# Patient Record
Sex: MALE | Race: WHITE | NOT HISPANIC OR LATINO | Employment: FULL TIME | URBAN - METROPOLITAN AREA
[De-identification: names, ages, dates, MRNs, and addresses within clinical notes are randomized per-mention and may not be internally consistent; named-entity substitution may affect disease eponyms.]

---

## 2023-09-21 LAB
ALANINE AMINOTRANSFERASE (SGPT) (U/L) IN SER/PLAS: 12 U/L (ref 10–52)
ALBUMIN (G/DL) IN SER/PLAS: 4.9 G/DL (ref 3.4–5)
ALKALINE PHOSPHATASE (U/L) IN SER/PLAS: 72 U/L (ref 33–120)
ANION GAP IN SER/PLAS: 16 MMOL/L (ref 10–20)
ASPARTATE AMINOTRANSFERASE (SGOT) (U/L) IN SER/PLAS: 16 U/L (ref 9–39)
BASOPHILS (10*3/UL) IN BLOOD BY AUTOMATED COUNT: 0.07 X10E9/L (ref 0–0.1)
BASOPHILS/100 LEUKOCYTES IN BLOOD BY AUTOMATED COUNT: 1.1 % (ref 0–2)
BILIRUBIN TOTAL (MG/DL) IN SER/PLAS: 0.6 MG/DL (ref 0–1.2)
CALCIDIOL (25 OH VITAMIN D3) (NG/ML) IN SER/PLAS: 38 NG/ML
CALCIUM (MG/DL) IN SER/PLAS: 9.9 MG/DL (ref 8.6–10.6)
CARBON DIOXIDE, TOTAL (MMOL/L) IN SER/PLAS: 25 MMOL/L (ref 21–32)
CHLORIDE (MMOL/L) IN SER/PLAS: 104 MMOL/L (ref 98–107)
CREATININE (MG/DL) IN SER/PLAS: 1 MG/DL (ref 0.5–1.3)
EOSINOPHILS (10*3/UL) IN BLOOD BY AUTOMATED COUNT: 0.29 X10E9/L (ref 0–0.7)
EOSINOPHILS/100 LEUKOCYTES IN BLOOD BY AUTOMATED COUNT: 4.5 % (ref 0–6)
ERYTHROCYTE DISTRIBUTION WIDTH (RATIO) BY AUTOMATED COUNT: 12.4 % (ref 11.5–14.5)
ERYTHROCYTE MEAN CORPUSCULAR HEMOGLOBIN CONCENTRATION (G/DL) BY AUTOMATED: 33.3 G/DL (ref 32–36)
ERYTHROCYTE MEAN CORPUSCULAR VOLUME (FL) BY AUTOMATED COUNT: 92 FL (ref 80–100)
ERYTHROCYTES (10*6/UL) IN BLOOD BY AUTOMATED COUNT: 4.64 X10E12/L (ref 4.5–5.9)
GFR MALE: >90 ML/MIN/1.73M2
GLUCOSE (MG/DL) IN SER/PLAS: 78 MG/DL (ref 74–99)
HEMATOCRIT (%) IN BLOOD BY AUTOMATED COUNT: 42.9 % (ref 41–52)
HEMOGLOBIN (G/DL) IN BLOOD: 14.3 G/DL (ref 13.5–17.5)
HIV 1/ 2 AG/AB SCREEN: NONREACTIVE
IMMATURE GRANULOCYTES/100 LEUKOCYTES IN BLOOD BY AUTOMATED COUNT: 0.3 % (ref 0–0.9)
LEUKOCYTES (10*3/UL) IN BLOOD BY AUTOMATED COUNT: 6.4 X10E9/L (ref 4.4–11.3)
LYMPHOCYTES (10*3/UL) IN BLOOD BY AUTOMATED COUNT: 1.08 X10E9/L (ref 1.2–4.8)
LYMPHOCYTES/100 LEUKOCYTES IN BLOOD BY AUTOMATED COUNT: 16.9 % (ref 13–44)
MONOCYTES (10*3/UL) IN BLOOD BY AUTOMATED COUNT: 0.66 X10E9/L (ref 0.1–1)
MONOCYTES/100 LEUKOCYTES IN BLOOD BY AUTOMATED COUNT: 10.3 % (ref 2–10)
NEUTROPHILS (10*3/UL) IN BLOOD BY AUTOMATED COUNT: 4.27 X10E9/L (ref 1.2–7.7)
NEUTROPHILS/100 LEUKOCYTES IN BLOOD BY AUTOMATED COUNT: 66.9 % (ref 40–80)
NRBC (PER 100 WBCS) BY AUTOMATED COUNT: 0 /100 WBC (ref 0–0)
PLATELETS (10*3/UL) IN BLOOD AUTOMATED COUNT: 448 X10E9/L (ref 150–450)
POTASSIUM (MMOL/L) IN SER/PLAS: 4.4 MMOL/L (ref 3.5–5.3)
PROTEIN TOTAL: 7.5 G/DL (ref 6.4–8.2)
SEDIMENTATION RATE, ERYTHROCYTE: 3 MM/H (ref 0–15)
SODIUM (MMOL/L) IN SER/PLAS: 141 MMOL/L (ref 136–145)
THYROTROPIN (MIU/L) IN SER/PLAS BY DETECTION LIMIT <= 0.05 MIU/L: 0.28 MIU/L (ref 0.44–3.98)
THYROXINE (T4) FREE (NG/DL) IN SER/PLAS: 1.24 NG/DL (ref 0.78–1.48)
TRIIODOTHYRONINE (T3) FREE (PG/ML) IN SER/PLAS: 3.8 PG/ML (ref 2.3–4.2)
UREA NITROGEN (MG/DL) IN SER/PLAS: 12 MG/DL (ref 6–23)

## 2023-10-02 ENCOUNTER — APPOINTMENT (OUTPATIENT)
Dept: RADIOLOGY | Facility: HOSPITAL | Age: 28
End: 2023-10-02
Payer: COMMERCIAL

## 2023-10-25 ENCOUNTER — TELEPHONE (OUTPATIENT)
Dept: PRIMARY CARE | Facility: CLINIC | Age: 28
End: 2023-10-25
Payer: COMMERCIAL

## 2023-11-01 ENCOUNTER — APPOINTMENT (OUTPATIENT)
Dept: RADIOLOGY | Facility: HOSPITAL | Age: 28
End: 2023-11-01
Payer: COMMERCIAL

## 2023-11-07 ENCOUNTER — TELEPHONE (OUTPATIENT)
Dept: PRIMARY CARE | Facility: CLINIC | Age: 28
End: 2023-11-07
Payer: COMMERCIAL

## 2023-11-14 ENCOUNTER — TELEPHONE (OUTPATIENT)
Dept: PRIMARY CARE | Facility: CLINIC | Age: 28
End: 2023-11-14
Payer: COMMERCIAL

## 2023-11-20 PROBLEM — S99.919A INJURY OF ANKLE: Status: ACTIVE | Noted: 2023-11-20

## 2023-11-20 PROBLEM — S93.409A SPRAIN OF ANKLE: Status: ACTIVE | Noted: 2023-11-20

## 2023-11-20 PROBLEM — L70.9 ACNE: Status: ACTIVE | Noted: 2023-11-20

## 2023-11-20 PROBLEM — R63.1 EXCESSIVE THIRST: Status: ACTIVE | Noted: 2023-11-20

## 2023-11-20 PROBLEM — E78.00 HYPERCHOLESTEROLEMIA: Status: ACTIVE | Noted: 2023-11-20

## 2023-11-20 PROBLEM — Q67.6 PECTUS EXCAVATUM: Status: ACTIVE | Noted: 2023-11-20

## 2025-03-13 ENCOUNTER — OFFICE VISIT (OUTPATIENT)
Dept: URGENT CARE | Age: 30
End: 2025-03-13
Payer: COMMERCIAL

## 2025-03-13 VITALS
OXYGEN SATURATION: 99 % | WEIGHT: 241 LBS | DIASTOLIC BLOOD PRESSURE: 69 MMHG | BODY MASS INDEX: 30.93 KG/M2 | HEART RATE: 80 BPM | RESPIRATION RATE: 18 BRPM | TEMPERATURE: 98.8 F | HEIGHT: 74 IN | SYSTOLIC BLOOD PRESSURE: 109 MMHG

## 2025-03-13 DIAGNOSIS — K59.00 CONSTIPATION, UNSPECIFIED CONSTIPATION TYPE: Primary | ICD-10-CM

## 2025-03-13 DIAGNOSIS — R10.32 LEFT LOWER QUADRANT ABDOMINAL PAIN: ICD-10-CM

## 2025-03-13 ASSESSMENT — ENCOUNTER SYMPTOMS
CHEST TIGHTNESS: 0
APPETITE CHANGE: 0
WOUND: 0
PALPITATIONS: 0
DIFFICULTY URINATING: 0
NAUSEA: 0
FREQUENCY: 0
HEMATOCHEZIA: 0
FACIAL SWELLING: 0
ABDOMINAL PAIN: 1
CHILLS: 0
WEIGHT LOSS: 1
BELCHING: 0
DIZZINESS: 0
HEADACHES: 0
EYE PAIN: 0
RHINORRHEA: 0
ANOREXIA: 0
FEVER: 0
MYALGIAS: 0
DYSURIA: 0
FATIGUE: 0
DIARRHEA: 0
HEMATURIA: 0
SINUS PRESSURE: 0
CONSTIPATION: 1
COUGH: 0
SINUS PAIN: 0
SORE THROAT: 0
ARTHRALGIAS: 0
FLATUS: 0
SHORTNESS OF BREATH: 0
JOINT SWELLING: 0
VOMITING: 0
BACK PAIN: 0

## 2025-03-13 ASSESSMENT — VISUAL ACUITY: OU: 1

## 2025-03-14 NOTE — PROGRESS NOTES
Subjective   Patient ID: Gerardo Echavarria is a 29 y.o. male. They present today with a chief complaint of Abdominal Pain (Left abdominal pain x 3 1/2 weeks.).    History of Present Illness  Patient is a 30yo male who presents with left abdominal pain x 3 1/2 weeks. Patient states that he has been having issues with constipation. His last bowel movement was two days ago and patient states it was normal. He also had a bowel movement the day before that. He states that he has pain in the LLQ after he eats so he has not been eating much the past month.           Abdominal Pain  This is a new problem. The current episode started 1 to 4 weeks ago. The onset quality is gradual. The problem occurs intermittently. The problem has been waxing and waning. The pain is located in the LLQ. The pain is mild. Associated symptoms include constipation and weight loss. Pertinent negatives include no anorexia, arthralgias, belching, diarrhea, dysuria, fever, flatus, frequency, headaches, hematochezia, hematuria, melena, myalgias, nausea or vomiting. The pain is aggravated by eating. He has tried nothing for the symptoms.       Past Medical History  Allergies as of 03/13/2025    (No Known Allergies)       (Not in a hospital admission)       Past Medical History:   Diagnosis Date    Other specified health status 09/03/2014    No pertinent past medical history       No past surgical history on file.     reports that he has never smoked. He has never been exposed to tobacco smoke. He has never used smokeless tobacco. He reports that he does not use drugs.    Review of Systems  Review of Systems   Constitutional:  Positive for weight loss. Negative for appetite change, chills, fatigue and fever.   HENT:  Negative for congestion, dental problem, drooling, ear discharge, ear pain, facial swelling, hearing loss, mouth sores, postnasal drip, rhinorrhea, sinus pressure, sinus pain, sneezing and sore throat.    Eyes:  Negative for pain.  "  Respiratory:  Negative for cough, chest tightness and shortness of breath.    Cardiovascular:  Negative for chest pain and palpitations.   Gastrointestinal:  Positive for abdominal pain and constipation. Negative for anorexia, diarrhea, flatus, hematochezia, melena, nausea and vomiting.   Genitourinary:  Negative for difficulty urinating, dysuria, frequency and hematuria.   Musculoskeletal:  Negative for arthralgias, back pain, gait problem, joint swelling and myalgias.   Skin:  Negative for rash and wound.   Neurological:  Negative for dizziness and headaches.   Psychiatric/Behavioral:  Negative for self-injury and suicidal ideas.                                   Objective    Vitals:    03/13/25 1910   BP: 109/69   BP Location: Left arm   Patient Position: Sitting   BP Cuff Size: Small adult   Pulse: 80   Resp: 18   Temp: 37.1 °C (98.8 °F)   TempSrc: Temporal   SpO2: 99%   Weight: 109 kg (241 lb)   Height: 1.88 m (6' 2\")     No LMP for male patient.    Physical Exam  Constitutional:       General: He is awake.      Appearance: Normal appearance. He is well-developed, well-groomed and normal weight. He is not ill-appearing.   HENT:      Head: Normocephalic and atraumatic.   Eyes:      General: Lids are normal. Vision grossly intact.   Cardiovascular:      Rate and Rhythm: Normal rate and regular rhythm.      Heart sounds: Normal heart sounds, S1 normal and S2 normal. Heart sounds not distant. No murmur heard.     No friction rub. No gallop.   Pulmonary:      Effort: Pulmonary effort is normal. No tachypnea, bradypnea, accessory muscle usage, prolonged expiration, respiratory distress or retractions.      Breath sounds: Normal breath sounds and air entry. No stridor, decreased air movement or transmitted upper airway sounds. No decreased breath sounds, wheezing, rhonchi or rales.   Chest:      Chest wall: No deformity.   Abdominal:      General: Abdomen is flat. Bowel sounds are normal. There is no distension. " There are no signs of injury.      Palpations: Abdomen is soft. There is no shifting dullness, fluid wave, hepatomegaly, splenomegaly, mass or pulsatile mass.      Tenderness: There is no abdominal tenderness. There is no right CVA tenderness, left CVA tenderness, guarding or rebound. Negative signs include Christopher's sign, Rovsing's sign and McBurney's sign.      Hernia: No hernia is present.   Skin:     General: Skin is warm and dry.      Capillary Refill: Capillary refill takes less than 2 seconds.   Neurological:      General: No focal deficit present.      Mental Status: He is alert.   Psychiatric:         Attention and Perception: Attention and perception normal.         Mood and Affect: Mood and affect normal.         Speech: Speech normal.         Behavior: Behavior normal. Behavior is cooperative.         Procedures    Point of Care Test & Imaging Results from this visit  No results found for this visit on 03/13/25.   No results found.    Diagnostic study results (if any) were reviewed by JOSS Falk.    Assessment/Plan   Allergies, medications, history, and pertinent labs/EKGs/Imaging reviewed by JOSS Falk.     Medical Decision Making  Physical exam was unremarkable. Referral to gastroenterology was placed for further evaluation.  Advised patient that he would require a ct scan or ultrasound. Patient was stable and not in any current acute distress.     Risks, benefits, and alternatives of the medications and treatment plan prescribed today were discussed, and patient expressed understanding. Plan follow up as discussed or as needed if any worsening symptoms or change in condition. Reinforced red flags including (but not limited to): severe or worsening abdominal pain; difficulty swallowing; stiff neck; shortness of breath; coughing or vomiting blood; chest pain; and new or increased fever are indications to go to the Emergency Department.    The patient voices  understanding of all medications. No barriers to adherence. Patient is taking all medications as prescribed and tolerating well. For any new medications, the patient was instructed of directions for and consequences of not taking medication and they were informed about the potential side effects and drug interactions. The after-visit summary was given to the patient and care instructions were reviewed with the patient. All questions were answered and the patient verbalized understanding of the plan of care for today.  -  Orders and Diagnoses  Diagnoses and all orders for this visit:  Constipation, unspecified constipation type  -     Referral to Gastroenterology; Future  Left lower quadrant abdominal pain  -     Referral to Gastroenterology; Future      Medical Admin Record      Patient disposition: Home    Electronically signed by JOSS Falk  10:12 PM

## 2025-03-18 ENCOUNTER — APPOINTMENT (OUTPATIENT)
Dept: PRIMARY CARE | Facility: CLINIC | Age: 30
End: 2025-03-18
Payer: COMMERCIAL

## 2025-03-18 VITALS
SYSTOLIC BLOOD PRESSURE: 109 MMHG | WEIGHT: 146.16 LBS | OXYGEN SATURATION: 97 % | DIASTOLIC BLOOD PRESSURE: 69 MMHG | RESPIRATION RATE: 17 BRPM | BODY MASS INDEX: 18.77 KG/M2 | HEART RATE: 90 BPM

## 2025-03-18 DIAGNOSIS — R10.32 LLQ PAIN: ICD-10-CM

## 2025-03-18 DIAGNOSIS — R14.0 BLOATING: ICD-10-CM

## 2025-03-18 DIAGNOSIS — K59.00 CONSTIPATION, UNSPECIFIED CONSTIPATION TYPE: ICD-10-CM

## 2025-03-18 DIAGNOSIS — R10.9 ABDOMINAL CRAMPING: ICD-10-CM

## 2025-03-18 DIAGNOSIS — Z00.00 ROUTINE GENERAL MEDICAL EXAMINATION AT A HEALTH CARE FACILITY: Primary | ICD-10-CM

## 2025-03-18 DIAGNOSIS — R19.7 DIARRHEA, UNSPECIFIED TYPE: ICD-10-CM

## 2025-03-18 PROCEDURE — 99395 PREV VISIT EST AGE 18-39: CPT | Performed by: PHYSICIAN ASSISTANT

## 2025-03-18 PROCEDURE — 1036F TOBACCO NON-USER: CPT | Performed by: PHYSICIAN ASSISTANT

## 2025-03-18 ASSESSMENT — ENCOUNTER SYMPTOMS
BACK PAIN: 0
DIARRHEA: 0
ANOREXIA: 1
ARTHRALGIAS: 0
PSYCHIATRIC NEGATIVE: 1
LOSS OF SENSATION IN FEET: 0
DIZZINESS: 0
OCCASIONAL FEELINGS OF UNSTEADINESS: 0
NAUSEA: 0
VOMITING: 0
DYSURIA: 0
ARTHRALGIAS: 1
RESPIRATORY NEGATIVE: 1
EYES NEGATIVE: 1
HEMATOLOGIC/LYMPHATIC NEGATIVE: 1
NEUROLOGICAL NEGATIVE: 1
HEADACHES: 0
FREQUENCY: 0
WHEEZING: 0
SHORTNESS OF BREATH: 0
DEPRESSION: 0
HEMATOCHEZIA: 1
ABDOMINAL PAIN: 1
ENDOCRINE NEGATIVE: 1
CONSTIPATION: 1
ALLERGIC/IMMUNOLOGIC NEGATIVE: 1
HEMATURIA: 0
PALPITATIONS: 0
COUGH: 0
FLATUS: 0
WEIGHT LOSS: 1
MYALGIAS: 0
NERVOUS/ANXIOUS: 0
FEVER: 0

## 2025-03-18 ASSESSMENT — PATIENT HEALTH QUESTIONNAIRE - PHQ9
1. LITTLE INTEREST OR PLEASURE IN DOING THINGS: NOT AT ALL
2. FEELING DOWN, DEPRESSED OR HOPELESS: NOT AT ALL
SUM OF ALL RESPONSES TO PHQ9 QUESTIONS 1 AND 2: 0

## 2025-03-18 ASSESSMENT — PAIN SCALES - GENERAL: PAINLEVEL_OUTOF10: 3

## 2025-03-18 NOTE — PROGRESS NOTES
Answers submitted by the patient for this visit:  Abdominal Pain Questionnaire (Submitted on 3/18/2025)  Chief Complaint: Abdominal pain  Chronicity: recurrent  Onset: 1 to 4 weeks ago  Onset quality: gradual  Frequency: daily  Episode duration: 4 Hours  Progression since onset: waxing and waning  Pain location: LLQ  Pain - numeric: 5/10  Pain quality: aching, cramping, a sensation of fullness  Radiates to: LUQ  anorexia: Yes  arthralgias: Yes  constipation: Yes  diarrhea: No  dysuria: No  fever: No  flatus: No  frequency: No  headaches: No  hematochezia: Yes  hematuria: No  melena: No  myalgias: No  nausea: No  weight loss: Yes  vomiting: No  Aggravated by: certain positions, eating  Relieved by: being still, certain positions, recumbency, sitting up  Diagnostic workup: CT scan  Subjective   Patient ID: Gerardo Echavarria is a 29 y.o. male who presents for Abdominal Pain (LLQ) and Constipation.    Abdominal Pain  This is a recurrent problem. The current episode started 1 to 4 weeks ago. The onset quality is gradual. The problem occurs daily. The most recent episode lasted 4 hours. The problem has been waxing and waning. The pain is located in the LLQ. The pain is at a severity of 5/10. The quality of the pain is aching, cramping and a sensation of fullness. The abdominal pain radiates to the LUQ. Associated symptoms include anorexia, constipation, hematochezia and weight loss. Pertinent negatives include no arthralgias, diarrhea, dysuria, fever, flatus, frequency, headaches, hematuria, melena, myalgias, nausea or vomiting. The pain is aggravated by certain positions and eating. The pain is relieved by Being still, certain positions, recumbency and sitting up. Prior diagnostic workup includes CT scan.   Constipation  Associated symptoms include abdominal pain, anorexia, hematochezia and weight loss. Pertinent negatives include no back pain, diarrhea, fever, flatus, melena, nausea or vomiting.        Patient Gerardo Echavarria  29 y.o. male is here today for annual exam  and follow up     single, works as an , lives with parents, and siblings   specialists none   due dental and vision no glasses or contacts      PMhx significant medical hx acne - abd issues over the past 2 years   PShx: none   Social hx: etoh social on weekend , no tobacco use, no illicit drug use   follows healthy diet and exercise- -basketball   immunizations flu vaccines, tdap is due   FMhx: mother 58yo alive and well , father 63yo alive and well siblings 5 all alive and well   Past medical, surgical, social, and family history all reviewed      patient states feeling well, no complaints at this time   denies N/V, headache, dizziness, CP, SOB, palpitations, edema, numbness, tingling, weakness  A chaperone was offered to the patient for the physical exam and  exam and was declined        C/o abdominal bloating cramping episode again 3 weeks with  - diarrhea  for 1-2daysa dn blood as well -- now resoloved  then constipation since for the past 3 weeks - one BM per 2 days -- eating less as well due to symptoms--pt stttes  when eating a full meal get s pain in left side- so only able to eat smaller amt's and snaking through out the day more or less   Wt steady from 2023 -- but did have episodes of abd issues  and wt loss in 2023  was 161-- and lose about 15-20 #s   Normal urine   Had CT abd in 2023   No  recent travel or illness        Review of Systems   Constitutional:  Positive for weight loss. Negative for fever.   HENT: Negative.     Eyes: Negative.    Respiratory: Negative.  Negative for cough, shortness of breath and wheezing.    Cardiovascular:  Negative for chest pain and palpitations.   Gastrointestinal:  Positive for abdominal pain, anorexia, constipation and hematochezia. Negative for diarrhea, flatus, melena, nausea and vomiting.   Endocrine: Negative.    Genitourinary: Negative.  Negative for dysuria, frequency and hematuria.   Musculoskeletal:   Negative for arthralgias, back pain and myalgias.   Skin: Negative.  Negative for rash.   Allergic/Immunologic: Negative.    Neurological: Negative.  Negative for dizziness and headaches.   Hematological: Negative.    Psychiatric/Behavioral: Negative.  The patient is not nervous/anxious.        Objective   /69 (BP Location: Left arm, Patient Position: Sitting)   Pulse 90   Resp 17   Wt 66.3 kg (146 lb 2.6 oz)   SpO2 97%   BMI 18.77 kg/m²     Physical Exam  Vitals and nursing note reviewed.   Constitutional:       Appearance: Normal appearance. He is normal weight.   HENT:      Head: Normocephalic and atraumatic.      Nose: Nose normal.      Mouth/Throat:      Mouth: Mucous membranes are moist.      Pharynx: Oropharynx is clear.   Eyes:      Pupils: Pupils are equal, round, and reactive to light.   Cardiovascular:      Rate and Rhythm: Normal rate and regular rhythm.      Heart sounds: Normal heart sounds.   Pulmonary:      Effort: Pulmonary effort is normal.      Breath sounds: Normal breath sounds.   Abdominal:      General: Bowel sounds are normal.      Palpations: Abdomen is soft.   Genitourinary:     Comments: Defer      Musculoskeletal:         General: Normal range of motion.      Cervical back: Neck supple.   Skin:     General: Skin is warm.   Neurological:      General: No focal deficit present.      Mental Status: He is alert and oriented to person, place, and time.   Psychiatric:         Mood and Affect: Mood normal.         Behavior: Behavior normal.         Thought Content: Thought content normal.         Judgment: Judgment normal.       Assessment/Plan   Problem List Items Addressed This Visit    None  Visit Diagnoses       Routine general medical examination at a health care facility    -  Primary    Relevant Orders    Lipid Panel    Hepatitis C Antibody    Abdominal cramping        Relevant Orders    CBC    Comprehensive Metabolic Panel    TSH with reflex to Free T4 if abnormal    Referral  to Gastroenterology    LLQ pain        Relevant Orders    CBC    Comprehensive Metabolic Panel    TSH with reflex to Free T4 if abnormal    Referral to Gastroenterology    Diarrhea, unspecified type        Relevant Orders    CBC    Comprehensive Metabolic Panel    TSH with reflex to Free T4 if abnormal    Referral to Gastroenterology    Constipation, unspecified constipation type        Relevant Orders    CBC    Comprehensive Metabolic Panel    TSH with reflex to Free T4 if abnormal    Referral to Gastroenterology    Bloating        Relevant Orders    CBC    Comprehensive Metabolic Panel    TSH with reflex to Free T4 if abnormal    Referral to Gastroenterology          Well exam   -  patient stable and healthy  -  discussed healthy diet and exercise plan   -  continue medications as directed, SEs, risks and options discussed   -  f/u with specialists as directed   -  dental and vision exams, f/u as directed   -  Labs today   -Follow-up annual exam in one year  -Follow-up in one week to discuss all results    Abd pain cramping bloating bloody stools   Labs  ordered today, pt advised to call office when results are available to discuss with provider    May need colonoscopy and CT scan   Refer to GI   R/o colitis crohns IBS - etc        Results of my examination, clinical findings, impression and diagnoses discussed with the patient as well as results of the latest test/lab work. The patient was in agreement with the plan and verbalized a good understanding of instructions and plans for treatment. At the end of the visit today, the patient felt that all questions had been answered satisfactorily. The patient was pleased with the visit and very appreciative for the care rendered.

## 2025-03-19 LAB
ALBUMIN SERPL-MCNC: 5.1 G/DL (ref 3.6–5.1)
ALP SERPL-CCNC: 65 U/L (ref 36–130)
ALT SERPL-CCNC: 9 U/L (ref 9–46)
ANION GAP SERPL CALCULATED.4IONS-SCNC: 8 MMOL/L (CALC) (ref 7–17)
AST SERPL-CCNC: 13 U/L (ref 10–40)
BILIRUB SERPL-MCNC: 0.7 MG/DL (ref 0.2–1.2)
BUN SERPL-MCNC: 11 MG/DL (ref 7–25)
CALCIUM SERPL-MCNC: 10 MG/DL (ref 8.6–10.3)
CHLORIDE SERPL-SCNC: 102 MMOL/L (ref 98–110)
CHOLEST SERPL-MCNC: 174 MG/DL
CHOLEST/HDLC SERPL: 2.9 (CALC)
CO2 SERPL-SCNC: 28 MMOL/L (ref 20–32)
CREAT SERPL-MCNC: 0.98 MG/DL (ref 0.6–1.24)
EGFRCR SERPLBLD CKD-EPI 2021: 108 ML/MIN/1.73M2
ERYTHROCYTE [DISTWIDTH] IN BLOOD BY AUTOMATED COUNT: 12 % (ref 11–15)
GLUCOSE SERPL-MCNC: 90 MG/DL (ref 65–99)
HCT VFR BLD AUTO: 43 % (ref 38.5–50)
HCV AB SERPL QL IA: NORMAL
HDLC SERPL-MCNC: 61 MG/DL
HGB BLD-MCNC: 14.7 G/DL (ref 13.2–17.1)
LDLC SERPL CALC-MCNC: 97 MG/DL (CALC)
MCH RBC QN AUTO: 30.8 PG (ref 27–33)
MCHC RBC AUTO-ENTMCNC: 34.2 G/DL (ref 32–36)
MCV RBC AUTO: 90 FL (ref 80–100)
NONHDLC SERPL-MCNC: 113 MG/DL (CALC)
PLATELET # BLD AUTO: 340 THOUSAND/UL (ref 140–400)
PMV BLD REES-ECKER: 10 FL (ref 7.5–12.5)
POTASSIUM SERPL-SCNC: 4.5 MMOL/L (ref 3.5–5.3)
PROT SERPL-MCNC: 7.7 G/DL (ref 6.1–8.1)
RBC # BLD AUTO: 4.78 MILLION/UL (ref 4.2–5.8)
SODIUM SERPL-SCNC: 138 MMOL/L (ref 135–146)
TRIGL SERPL-MCNC: 70 MG/DL
TSH SERPL-ACNC: 0.58 MIU/L (ref 0.4–4.5)
WBC # BLD AUTO: 3.8 THOUSAND/UL (ref 3.8–10.8)

## 2025-03-20 NOTE — RESULT ENCOUNTER NOTE
Please call and inform pt that all labs are stable - please see GI  next week   And call office with any further questions or concerns

## 2025-03-24 ENCOUNTER — APPOINTMENT (OUTPATIENT)
Dept: GASTROENTEROLOGY | Facility: CLINIC | Age: 30
End: 2025-03-24
Payer: COMMERCIAL

## 2025-03-24 VITALS — HEART RATE: 86 BPM | BODY MASS INDEX: 19.25 KG/M2 | HEIGHT: 74 IN | WEIGHT: 150 LBS | OXYGEN SATURATION: 100 %

## 2025-03-24 DIAGNOSIS — R10.9 ABDOMINAL CRAMPING: ICD-10-CM

## 2025-03-24 DIAGNOSIS — R19.7 DIARRHEA, UNSPECIFIED TYPE: ICD-10-CM

## 2025-03-24 DIAGNOSIS — K62.5 RECTAL BLEEDING: Primary | ICD-10-CM

## 2025-03-24 DIAGNOSIS — R14.0 BLOATING: ICD-10-CM

## 2025-03-24 DIAGNOSIS — R10.32 LLQ PAIN: ICD-10-CM

## 2025-03-24 DIAGNOSIS — K59.00 CONSTIPATION, UNSPECIFIED CONSTIPATION TYPE: ICD-10-CM

## 2025-03-24 PROCEDURE — 99204 OFFICE O/P NEW MOD 45 MIN: CPT

## 2025-03-24 PROCEDURE — 3008F BODY MASS INDEX DOCD: CPT

## 2025-03-24 RX ORDER — SODIUM, POTASSIUM,MAG SULFATES 17.5-3.13G
1 SOLUTION, RECONSTITUTED, ORAL ORAL ONCE
Qty: 1 EACH | Refills: 0 | Status: SHIPPED | OUTPATIENT
Start: 2025-03-24 | End: 2025-03-24

## 2025-03-24 ASSESSMENT — ENCOUNTER SYMPTOMS
CONSTIPATION: 1
DIARRHEA: 1
VOMITING: 0
BLOOD IN STOOL: 0
NAUSEA: 0
FEVER: 0
TROUBLE SWALLOWING: 0
COUGH: 0
APPETITE CHANGE: 0
SHORTNESS OF BREATH: 0
ANAL BLEEDING: 1
CHILLS: 0
ABDOMINAL PAIN: 1
ABDOMINAL DISTENTION: 0
RECTAL PAIN: 0
FATIGUE: 0

## 2025-03-24 NOTE — PROGRESS NOTES
Subjective     History of Present Illness:   Gerardo Echavarria is a 29 y.o. male with PMHx of HLD who presents to GI clinic for further evaluation LLQ pain, hematochezia and constipation    Today, patient has had sharp severe squeezing low abdominal pain followed by diarrhea x 1 week then constipation then dull LLQ pain.  Symptoms started about a month ago.  Diarrhea was loose 4-5 times daily with bright red blood on toilet paper. Went 4 days without bowel movement then hard stool.  Stool is dark brown.  LLQ pain is triggered by eating. Has been eating less because of it.  Recently, constipation appears to be resolving.  Stopped drinking alcohol when this started.   Has had 2 episodes like this 2019 and 2023.  He is not able to identify any correlating factors.  3/2025 normal H&H  Denies dyspepsia, melena, dysphagia, unintentional weight loss    ETOH 10-15 drinks per week, denies smoking, denies marijuana  Denies fxh GI cancer paternal uncle pancreatic cancer.  Denies fhx IBD  Abdominal Surgeries: denies    Denies history of colonoscopy   Denies history of EGD       Past Medical History  As per HPI.     Social History  he  reports that he has never smoked. He has never been exposed to tobacco smoke. He has never used smokeless tobacco. He reports that he does not use drugs.     Family History  his family history is not on file.     Review of Systems  Review of Systems   Constitutional:  Negative for appetite change, chills, fatigue and fever.   HENT:  Negative for trouble swallowing.    Respiratory:  Negative for cough and shortness of breath.    Gastrointestinal:  Positive for abdominal pain, anal bleeding, constipation and diarrhea. Negative for abdominal distention, blood in stool, nausea, rectal pain and vomiting.       Allergies  No Known Allergies    Medications  Current Outpatient Medications   Medication Instructions    sodium,potassium,mag sulfates (Suprep Bowel Prep Kit) 17.5-3.13-1.6 gram solution 2 bottles,  oral, Once, Take as directed.        Objective   Visit Vitals  Pulse 86      Physical Exam  Constitutional:       Appearance: Normal appearance. He is normal weight.   HENT:      Mouth/Throat:      Mouth: Mucous membranes are dry.      Pharynx: Oropharynx is clear.   Cardiovascular:      Rate and Rhythm: Normal rate and regular rhythm.   Pulmonary:      Effort: Pulmonary effort is normal.      Breath sounds: Normal breath sounds. No wheezing or rhonchi.   Abdominal:      General: Abdomen is flat. Bowel sounds are normal. There is no distension.      Palpations: Abdomen is soft. There is no hepatomegaly.      Tenderness: There is abdominal tenderness (LLQ). There is no guarding or rebound. Negative signs include Christopher's sign.      Hernia: No hernia is present.   Musculoskeletal:         General: Normal range of motion.   Skin:     General: Skin is warm and dry.   Neurological:      General: No focal deficit present.      Mental Status: He is alert and oriented to person, place, and time.   Psychiatric:         Mood and Affect: Mood normal.         Behavior: Behavior normal.           Lab Results   Component Value Date    WBC 3.8 03/18/2025    WBC 6.4 09/21/2023    WBC 5.9 01/12/2023    HGB 14.7 03/18/2025    HGB 14.3 09/21/2023    HGB 13.8 01/12/2023    HCT 43.0 03/18/2025    HCT 42.9 09/21/2023    HCT 40.2 (L) 01/12/2023     03/18/2025     09/21/2023     01/12/2023     Lab Results   Component Value Date     03/18/2025     09/21/2023     01/12/2023    K 4.5 03/18/2025    K 4.4 09/21/2023    K 4.4 01/12/2023     03/18/2025     09/21/2023     01/12/2023    CO2 28 03/18/2025    CO2 25 09/21/2023    CO2 28 01/12/2023    BUN 11 03/18/2025    BUN 12 09/21/2023    BUN 17 01/12/2023    CREATININE 0.98 03/18/2025    CREATININE 1.00 09/21/2023    CREATININE 1.12 01/12/2023    CALCIUM 10.0 03/18/2025    CALCIUM 9.9 09/21/2023    CALCIUM 10.1 01/12/2023    PROT 7.7  03/18/2025    PROT 7.5 09/21/2023    PROT 7.9 01/12/2023    BILITOT 0.7 03/18/2025    BILITOT 0.6 09/21/2023    BILITOT 0.6 01/12/2023    ALKPHOS 65 03/18/2025    ALKPHOS 72 09/21/2023    ALKPHOS 89 01/12/2023    ALT 9 03/18/2025    ALT 12 09/21/2023    ALT 17 01/12/2023    AST 13 03/18/2025    AST 16 09/21/2023    AST 21 01/12/2023    GLUCOSE 90 03/18/2025    GLUCOSE 78 09/21/2023    GLUCOSE 94 01/12/2023           Gerardo Echavarria is a 29 y.o. male who presents to GI clinic for rectal bleeding, abdominal pain, constipation versus diarrhea.    LLQ pain  Consider IBD, possibly diverticulitis, IBS with hemorrhoidal bleeding  -Colonoscopy ordered    Follow-up 3 weeks postprocedure.  Consider CT abdomen pelvis         Abby Boles, APRN-CNP

## 2025-03-24 NOTE — ASSESSMENT & PLAN NOTE
Consider IBD, possibly diverticulitis, IBS with hemorrhoidal bleeding  -Colonoscopy ordered    Follow-up 3 weeks postprocedure.  Consider CT abdomen pelvis

## 2025-03-24 NOTE — PATIENT INSTRUCTIONS
Please schedule your colonoscopy. You will need a ride since this involves sedation.  I will send a bowel prep to your pharmacy.  Clear liquid diet the day before the procedure. Start the 1st part of the prep at 6 pm the night prior and the other half 5 hrs before the procedure.  Please notify me 1 week before if you are constipated.  Please follow up 3 weeks post procedure

## 2025-04-22 ENCOUNTER — APPOINTMENT (OUTPATIENT)
Dept: GASTROENTEROLOGY | Facility: EXTERNAL LOCATION | Age: 30
End: 2025-04-22
Payer: COMMERCIAL

## 2025-04-22 DIAGNOSIS — R10.32 LLQ PAIN: ICD-10-CM

## 2025-04-22 DIAGNOSIS — R19.7 DIARRHEA, UNSPECIFIED TYPE: ICD-10-CM

## 2025-04-22 DIAGNOSIS — K59.00 CONSTIPATION, UNSPECIFIED CONSTIPATION TYPE: ICD-10-CM

## 2025-04-22 DIAGNOSIS — K62.5 RECTAL BLEEDING: ICD-10-CM

## 2025-04-22 DIAGNOSIS — R10.9 ABDOMINAL CRAMPING: ICD-10-CM

## 2025-04-22 DIAGNOSIS — K52.9 NONINFECTIOUS COLITIS: ICD-10-CM

## 2025-04-22 DIAGNOSIS — K62.5 RECTAL BLEEDING: Primary | ICD-10-CM

## 2025-04-22 PROCEDURE — 88305 TISSUE EXAM BY PATHOLOGIST: CPT

## 2025-04-22 PROCEDURE — 45380 COLONOSCOPY AND BIOPSY: CPT | Performed by: INTERNAL MEDICINE

## 2025-04-22 PROCEDURE — 88305 TISSUE EXAM BY PATHOLOGIST: CPT | Performed by: PATHOLOGY

## 2025-04-22 PROCEDURE — 0753T DGTZ GLS MCRSCP SLD LEVEL IV: CPT

## 2025-04-22 NOTE — PROGRESS NOTES
Colonoscopy performed today 4/22/2025 at the [facility] ([facility]  See procedure report(s) under Media tab.

## 2025-04-23 ENCOUNTER — LAB REQUISITION (OUTPATIENT)
Dept: LAB | Facility: HOSPITAL | Age: 30
End: 2025-04-23
Payer: COMMERCIAL

## 2025-04-30 ENCOUNTER — TELEPHONE (OUTPATIENT)
Dept: GASTROENTEROLOGY | Facility: CLINIC | Age: 30
End: 2025-04-30
Payer: COMMERCIAL

## 2025-04-30 LAB
LABORATORY COMMENT REPORT: NORMAL
PATH REPORT.FINAL DX SPEC: NORMAL
PATH REPORT.GROSS SPEC: NORMAL
PATH REPORT.RELEVANT HX SPEC: NORMAL
PATH REPORT.TOTAL CANCER: NORMAL

## 2025-04-30 NOTE — RESULT ENCOUNTER NOTE
Biopsies and endoscopic appearance do suggest ulcerative proctitis.  He may likely benefit from mesalamine therapy, will defer to primary GI provider.

## 2025-06-11 ENCOUNTER — APPOINTMENT (OUTPATIENT)
Dept: GASTROENTEROLOGY | Facility: CLINIC | Age: 30
End: 2025-06-11
Payer: COMMERCIAL

## 2025-06-11 VITALS — BODY MASS INDEX: 19.51 KG/M2 | HEART RATE: 84 BPM | WEIGHT: 152 LBS | HEIGHT: 74 IN | OXYGEN SATURATION: 97 %

## 2025-06-11 DIAGNOSIS — K51.80 OTHER ULCERATIVE COLITIS WITHOUT COMPLICATION: Primary | ICD-10-CM

## 2025-06-11 PROBLEM — K51.90 ULCERATIVE COLITIS WITHOUT COMPLICATIONS (MULTI): Status: ACTIVE | Noted: 2025-06-11

## 2025-06-11 PROCEDURE — 3008F BODY MASS INDEX DOCD: CPT

## 2025-06-11 PROCEDURE — 99213 OFFICE O/P EST LOW 20 MIN: CPT

## 2025-06-11 RX ORDER — MESALAMINE 1.2 G/1
2400 TABLET, DELAYED RELEASE ORAL 2 TIMES DAILY
Qty: 120 TABLET | Refills: 11 | Status: SHIPPED | OUTPATIENT
Start: 2025-06-11 | End: 2026-06-11

## 2025-06-11 ASSESSMENT — ENCOUNTER SYMPTOMS
NAUSEA: 0
APPETITE CHANGE: 0
ABDOMINAL PAIN: 0
TROUBLE SWALLOWING: 0
BLOOD IN STOOL: 0
CHILLS: 0
VOMITING: 0
CONSTIPATION: 0
RECTAL PAIN: 0
COUGH: 0
ANAL BLEEDING: 0
SHORTNESS OF BREATH: 0
FATIGUE: 0
ABDOMINAL DISTENTION: 0
FEVER: 0
DIARRHEA: 0

## 2025-06-11 NOTE — ASSESSMENT & PLAN NOTE
Symptoms most suggestive of ulcerative colitis  - appears asymptomatic currently  -Will start 2.4 g Lialda daily  -Follow-up in 6 months to reassess patient's symptoms.  Will draw labs.  May consider stopping mesalamine and restarting if symptoms recur

## 2025-06-11 NOTE — PROGRESS NOTES
Subjective     History of Present Illness:   Gerardo Echavarria is a 30 y.o. male with PMHx of HLD who presents to GI clinic for further evaluation LLQ pain, hematochezia and constipation    Last seen 3/2025 for LLQ pain, colonoscopy ordered    Today, patient states his GI symptoms seem to subside without any intervention.  Currently he is moving bowels daily with normal formed stools..  Was having loose stools 4-5 times daily with bright red blood on toilet paper, a few days without bowel movement.  Has had episodes in 2019 and 2023.  Thinks maternal grandma had UC.   3/2025 normal H&H  Denies dyspepsia, melena, dysphagia, unintentional weight loss    ETOH 10-15 drinks per week, denies smoking, denies marijuana  Denies fxh GI cancer paternal uncle pancreatic cancer.  Denies fhx IBD  Abdominal Surgeries: denies    colonoscopy 4/2025 Dr. Vu: Patchy mild inflammation in cecum, ascending, rectum, internal hemorrhoids.  Pathology: Cryptitis, nonspecific, suggestive of UC proctitis, consider mesalamine  Denies history of EGD       Past Medical History  As per HPI.     Social History  he  reports that he has never smoked. He has never been exposed to tobacco smoke. He has never used smokeless tobacco. He reports that he does not use drugs.     Family History  his family history is not on file.     Review of Systems  Review of Systems   Constitutional:  Negative for appetite change, chills, fatigue and fever.   HENT:  Negative for trouble swallowing.    Respiratory:  Negative for cough and shortness of breath.    Gastrointestinal:  Negative for abdominal distention, abdominal pain, anal bleeding, blood in stool, constipation, diarrhea, nausea, rectal pain and vomiting.       Allergies  No Known Allergies    Medications  Current Outpatient Medications   Medication Instructions    mesalamine (LIALDA) 2.4 g, oral, 2 times daily, Do not crush, chew, or split.          Objective   Visit Vitals  Pulse 84        Physical  Exam      Lab Results   Component Value Date    WBC 3.8 03/18/2025    WBC 6.4 09/21/2023    WBC 5.9 01/12/2023    HGB 14.7 03/18/2025    HGB 14.3 09/21/2023    HGB 13.8 01/12/2023    HCT 43.0 03/18/2025    HCT 42.9 09/21/2023    HCT 40.2 (L) 01/12/2023     03/18/2025     09/21/2023     01/12/2023     Lab Results   Component Value Date     03/18/2025     09/21/2023     01/12/2023    K 4.5 03/18/2025    K 4.4 09/21/2023    K 4.4 01/12/2023     03/18/2025     09/21/2023     01/12/2023    CO2 28 03/18/2025    CO2 25 09/21/2023    CO2 28 01/12/2023    BUN 11 03/18/2025    BUN 12 09/21/2023    BUN 17 01/12/2023    CREATININE 0.98 03/18/2025    CREATININE 1.00 09/21/2023    CREATININE 1.12 01/12/2023    CALCIUM 10.0 03/18/2025    CALCIUM 9.9 09/21/2023    CALCIUM 10.1 01/12/2023    PROT 7.7 03/18/2025    PROT 7.5 09/21/2023    PROT 7.9 01/12/2023    BILITOT 0.7 03/18/2025    BILITOT 0.6 09/21/2023    BILITOT 0.6 01/12/2023    ALKPHOS 65 03/18/2025    ALKPHOS 72 09/21/2023    ALKPHOS 89 01/12/2023    ALT 9 03/18/2025    ALT 12 09/21/2023    ALT 17 01/12/2023    AST 13 03/18/2025    AST 16 09/21/2023    AST 21 01/12/2023    GLUCOSE 90 03/18/2025    GLUCOSE 78 09/21/2023    GLUCOSE 94 01/12/2023           Gerardo Echavarria is a 30 y.o. male who presents to GI clinic for rectal bleeding, abdominal pain, constipation versus diarrhea.    Ulcerative colitis without complications (Multi)  Symptoms most suggestive of ulcerative colitis  - appears asymptomatic currently  -Will start 2.4 g Lialda daily  -Follow-up in 6 months to reassess patient's symptoms.  Will draw labs.  May consider stopping mesalamine and restarting if symptoms recur           Abby Boles, APRN-CNP

## 2025-06-11 NOTE — PATIENT INSTRUCTIONS
Please start mesalamine/lialda for symptoms suggestive of ulcerative colitis  Follow up in 6 months.  We will draw labs and reassess symptoms.